# Patient Record
Sex: FEMALE | Race: ASIAN | NOT HISPANIC OR LATINO | Employment: OTHER | URBAN - METROPOLITAN AREA
[De-identification: names, ages, dates, MRNs, and addresses within clinical notes are randomized per-mention and may not be internally consistent; named-entity substitution may affect disease eponyms.]

---

## 2017-11-11 ENCOUNTER — OFFICE VISIT (OUTPATIENT)
Dept: URGENT CARE | Facility: CLINIC | Age: 82
End: 2017-11-11
Payer: MEDICARE

## 2017-11-11 VITALS
RESPIRATION RATE: 16 BRPM | SYSTOLIC BLOOD PRESSURE: 158 MMHG | WEIGHT: 99 LBS | BODY MASS INDEX: 19.96 KG/M2 | HEART RATE: 79 BPM | TEMPERATURE: 97 F | HEIGHT: 59 IN | DIASTOLIC BLOOD PRESSURE: 66 MMHG | OXYGEN SATURATION: 97 %

## 2017-11-11 DIAGNOSIS — S01.511A LACERATION OF LOWER LIP, INITIAL ENCOUNTER: Primary | ICD-10-CM

## 2017-11-11 PROCEDURE — 99204 OFFICE O/P NEW MOD 45 MIN: CPT | Mod: S$GLB,,, | Performed by: PREVENTIVE MEDICINE

## 2017-11-11 RX ORDER — LISINOPRIL 10 MG/1
10 TABLET ORAL DAILY
COMMUNITY

## 2017-11-11 RX ORDER — TRAMADOL HYDROCHLORIDE 50 MG/1
50 TABLET ORAL EVERY 8 HOURS PRN
Qty: 20 TABLET | Refills: 0 | Status: SHIPPED | OUTPATIENT
Start: 2017-11-11 | End: 2017-11-21

## 2017-11-11 RX ORDER — DOXYCYCLINE HYCLATE 100 MG
100 TABLET ORAL 2 TIMES DAILY
Qty: 10 TABLET | Refills: 0 | Status: SHIPPED | OUTPATIENT
Start: 2017-11-11

## 2017-11-11 NOTE — PROGRESS NOTES
"Subjective:       Patient ID: Shantell Salazar is a 89 y.o. female.    Vitals:  height is 4' 11" (1.499 m) and weight is 44.9 kg (99 lb). Her temperature is 97.3 °F (36.3 °C). Her blood pressure is 158/66 (abnormal) and her pulse is 79. Her respiration is 16 and oxygen saturation is 97%.     Chief Complaint: Lip Laceration    Pt states she tripped on her way to the bathroom this morning around 4am  and cut her upper lip. She think she may cut it on her dental bridge. Pt states she doesn't think her tetanus is up to date      Laceration    The incident occurred 6 to 12 hours ago. The laceration is located on the lips. The laceration mechanism was a blunt object. The pain is at a severity of 3/10. The pain is mild.     Review of Systems   Constitution: Negative for chills, fever, weakness and malaise/fatigue.   HENT: Negative for nosebleeds and sore throat.    Eyes: Negative for blurred vision.   Cardiovascular: Negative for chest pain and syncope.   Respiratory: Negative for shortness of breath.    Skin: Negative for rash.   Musculoskeletal: Negative for back pain, joint pain and neck pain.   Gastrointestinal: Negative for abdominal pain, diarrhea, nausea and vomiting.   Genitourinary: Negative for hematuria.   Neurological: Negative for dizziness, headaches and numbness.   Psychiatric/Behavioral: The patient is not nervous/anxious.        Objective:      Physical Exam   Constitutional: She appears well-developed and well-nourished.   HENT:   Head: Normocephalic and atraumatic.   Eyes: EOM are normal.   Neck: Normal range of motion.   Cardiovascular: Normal rate.    Pulmonary/Chest: Effort normal.   Musculoskeletal:        Lumbar back: She exhibits decreased range of motion, tenderness, pain and spasm. She exhibits no bony tenderness, no swelling, no edema, no deformity, no laceration and normal pulse.   Neurological: She is alert.   No focal neurologic deficits   Skin: Skin is warm and dry. Laceration noted.      "   Laceration of the right side of  lower lip extending into the vermilion border.    Psychiatric: She has a normal mood and affect.   Nursing note and vitals reviewed.      Assessment:       1. Laceration of lower lip, initial encounter        Plan:         Laceration of lower lip, initial encounter    Other orders  -     traMADol (ULTRAM) 50 mg tablet; Take 1 tablet (50 mg total) by mouth every 8 (eight) hours as needed for Pain.  Dispense: 20 tablet; Refill: 0  -     doxycycline (VIBRA-TABS) 100 MG tablet; Take 1 tablet (100 mg total) by mouth 2 (two) times daily.  Dispense: 10 tablet; Refill: 0    Discharge Instructions:    Return here or go to the emergency department if your symptoms become more severe.    If antibiotics are prescribed, please take them until completed.    If narcotic medication is prescribed, do not drive or operate heavy machinery or equipment while taking mediation.    Follow up with your primary care provider or specialist in 3-4 days.

## 2020-11-12 ENCOUNTER — OFFICE VISIT (OUTPATIENT)
Dept: URGENT CARE | Facility: CLINIC | Age: 85
End: 2020-11-12
Payer: MEDICARE

## 2020-11-12 VITALS
WEIGHT: 108 LBS | HEART RATE: 96 BPM | OXYGEN SATURATION: 97 % | DIASTOLIC BLOOD PRESSURE: 65 MMHG | HEIGHT: 59 IN | SYSTOLIC BLOOD PRESSURE: 134 MMHG | TEMPERATURE: 99 F | BODY MASS INDEX: 21.77 KG/M2

## 2020-11-12 DIAGNOSIS — L03.115 CELLULITIS OF RIGHT LOWER LEG: Primary | ICD-10-CM

## 2020-11-12 PROCEDURE — 99214 PR OFFICE/OUTPT VISIT, EST, LEVL IV, 30-39 MIN: ICD-10-PCS | Mod: 25,S$GLB,, | Performed by: INTERNAL MEDICINE

## 2020-11-12 PROCEDURE — 96372 PR INJECTION,THERAP/PROPH/DIAG2ST, IM OR SUBCUT: ICD-10-PCS | Mod: S$GLB,,, | Performed by: INTERNAL MEDICINE

## 2020-11-12 PROCEDURE — 99214 OFFICE O/P EST MOD 30 MIN: CPT | Mod: 25,S$GLB,, | Performed by: INTERNAL MEDICINE

## 2020-11-12 PROCEDURE — 96372 THER/PROPH/DIAG INJ SC/IM: CPT | Mod: S$GLB,,, | Performed by: INTERNAL MEDICINE

## 2020-11-12 RX ORDER — DOXYCYCLINE 100 MG/1
100 CAPSULE ORAL 2 TIMES DAILY
Qty: 14 CAPSULE | Refills: 0 | Status: SHIPPED | OUTPATIENT
Start: 2020-11-12 | End: 2020-11-19

## 2020-11-12 RX ORDER — CEFTRIAXONE 1 G/1
1 INJECTION, POWDER, FOR SOLUTION INTRAMUSCULAR; INTRAVENOUS
Status: COMPLETED | OUTPATIENT
Start: 2020-11-12 | End: 2020-11-12

## 2020-11-12 RX ADMIN — CEFTRIAXONE 1 G: 1 INJECTION, POWDER, FOR SOLUTION INTRAMUSCULAR; INTRAVENOUS at 04:11

## 2020-11-12 NOTE — PROGRESS NOTES
"Subjective:       Patient ID: Shantell Salazar is a 89 y.o. female.    Vitals:  height is 4' 11" (1.499 m) and weight is 49 kg (108 lb). Her temperature is 99.4 °F (37.4 °C). Her blood pressure is 134/65 and her pulse is 96. Her oxygen saturation is 97%.     Chief Complaint: Edema    Pt reports that her skin began getting red and painful approximately 3 days ago. Pt states that is is tender to palpation.     Edema  This is a new problem. The current episode started in the past 7 days (3 days). The problem occurs constantly. The problem has been gradually worsening. Pertinent negatives include no abdominal pain, anorexia, arthralgias, change in bowel habit, chest pain, chills, congestion, coughing, diaphoresis, fatigue, fever, headaches, joint swelling, myalgias, nausea, neck pain, numbness, rash, sore throat, swollen glands, urinary symptoms, vertigo, visual change, vomiting or weakness. Nothing aggravates the symptoms. She has tried nothing for the symptoms.       Constitution: Negative for activity change, appetite change, chills, sweating, fatigue and fever.   HENT: Negative for congestion and sore throat.    Neck: Negative for neck pain.   Cardiovascular: Negative for chest pain.   Respiratory: Negative for cough.    Gastrointestinal: Negative for abdominal pain, nausea and vomiting.   Musculoskeletal: Positive for pain. Negative for joint pain, joint swelling and muscle ache.   Skin: Positive for color change. Negative for pale, rash, wound, abrasion, laceration, lesion, skin thickening/induration, puncture wound, erythema, bruising, abscess, avulsion and hives.   Allergic/Immunologic: Negative for hives.   Neurological: Negative for history of vertigo, headaches and numbness.       Objective:      Physical Exam   Cardiovascular: Normal pulses.   Abdominal: Normal appearance.   Musculoskeletal: Normal range of motion.   Neurological: She is alert.   Skin: erythema        Comments: Swelling,erythema,warmth and tenderness " R lower leg mid shin to foot   Nursing note and vitals reviewed.        Assessment:       1. Cellulitis of right lower leg        Plan:         Cellulitis of right lower leg  -     cefTRIAXone injection 1 g  -     doxycycline (VIBRAMYCIN) 100 MG Cap; Take 1 capsule (100 mg total) by mouth 2 (two) times daily. for 7 days  Dispense: 14 capsule; Refill: 0